# Patient Record
(demographics unavailable — no encounter records)

---

## 2025-04-14 NOTE — HISTORY OF PRESENT ILLNESS
[FreeTextEntry1] : 22 yo G0 presents for [employer]. Denies any complaints at this time, Desire to start birth control. She has never had a pap smear before, periods are regular every month. She had recent negative STI testing

## 2025-04-14 NOTE — DISCUSSION/SUMMARY
Head, normocephalic, atraumatic, Face, Face within normal limits, Ears, External ears within normal limits [FreeTextEntry1] : 20 yo for [employer] -f/u pap -rx given for OCP's, discussed how to use and need for back up methof for first two weeks.

## 2025-04-14 NOTE — PHYSICAL EXAM
[Chaperoned Physical Exam] : A chaperone was present in the examining room during all aspects of the physical examination. [MA] : MA [FreeTextEntry2] : Viki [Appropriately responsive] : appropriately responsive [Soft] : soft [No Lesions] : no lesions [Examination Of The Breasts] : a normal appearance [No Masses] : no breast masses were palpable [Labia Majora] : normal [Labia Minora] : normal [No Bleeding] : There was no active vaginal bleeding [Normal] : normal [Uterine Adnexae] : normal